# Patient Record
Sex: FEMALE | Employment: OTHER
[De-identification: names, ages, dates, MRNs, and addresses within clinical notes are randomized per-mention and may not be internally consistent; named-entity substitution may affect disease eponyms.]

---

## 2024-07-30 PROBLEM — M81.0 OSTEOPOROSIS: Status: ACTIVE | Noted: 2024-07-30

## 2024-08-07 ENCOUNTER — HOSPITAL ENCOUNTER (OUTPATIENT)
Facility: HOSPITAL | Age: 74
Setting detail: INFUSION SERIES
Discharge: HOME | End: 2024-08-07
Payer: COMMERCIAL

## 2024-08-07 VITALS
BODY MASS INDEX: 24.68 KG/M2 | HEART RATE: 69 BPM | RESPIRATION RATE: 18 BRPM | SYSTOLIC BLOOD PRESSURE: 135 MMHG | DIASTOLIC BLOOD PRESSURE: 85 MMHG | HEIGHT: 66 IN | WEIGHT: 153.6 LBS | TEMPERATURE: 97.4 F

## 2024-08-07 DIAGNOSIS — M81.0 OSTEOPOROSIS, UNSPECIFIED OSTEOPOROSIS TYPE, UNSPECIFIED PATHOLOGICAL FRACTURE PRESENCE: Primary | ICD-10-CM

## 2024-08-07 LAB
ANION GAP BLD CALC-SCNC: 6.6 MMOL/L (ref 10–20)
CA-I BLD-MCNC: 1.28 MMOL/L (ref 1.12–1.32)
CHLORIDE BLD-SCNC: 104 MMOL/L (ref 98–107)
CO2 BLD-SCNC: 30.4 MMOL/L (ref 21–32)
CREAT BLD-MCNC: 0.79 MG/DL (ref 0.6–1.3)
GLUCOSE BLD-MCNC: 104 MG/DL (ref 70–110)
POTASSIUM BLD-SCNC: 4.2 MMOL/L (ref 3.5–5.1)
SERVICE CMNT-IMP: ABNORMAL
SODIUM BLD-SCNC: 141 MMOL/L (ref 136–145)

## 2024-08-07 PROCEDURE — 6360000002 HC RX W HCPCS: Performed by: INTERNAL MEDICINE

## 2024-08-07 PROCEDURE — 2580000003 HC RX 258: Performed by: INTERNAL MEDICINE

## 2024-08-07 PROCEDURE — 96365 THER/PROPH/DIAG IV INF INIT: CPT

## 2024-08-07 PROCEDURE — 80047 BASIC METABLC PNL IONIZED CA: CPT

## 2024-08-07 RX ORDER — SODIUM CHLORIDE 9 MG/ML
5-250 INJECTION, SOLUTION INTRAVENOUS PRN
Status: CANCELLED | OUTPATIENT
Start: 2024-08-07

## 2024-08-07 RX ORDER — SODIUM CHLORIDE 9 MG/ML
5-250 INJECTION, SOLUTION INTRAVENOUS PRN
Status: DISCONTINUED | OUTPATIENT
Start: 2024-08-07 | End: 2024-08-08 | Stop reason: HOSPADM

## 2024-08-07 RX ORDER — ZOLEDRONIC ACID 5 MG/100ML
5 INJECTION, SOLUTION INTRAVENOUS ONCE
Status: CANCELLED | OUTPATIENT
Start: 2024-08-07 | End: 2024-08-07

## 2024-08-07 RX ORDER — ZOLEDRONIC ACID 5 MG/100ML
5 INJECTION, SOLUTION INTRAVENOUS ONCE
Status: COMPLETED | OUTPATIENT
Start: 2024-08-07 | End: 2024-08-07

## 2024-08-07 RX ADMIN — SODIUM CHLORIDE 50 ML/HR: 9 INJECTION, SOLUTION INTRAVENOUS at 11:49

## 2024-08-07 RX ADMIN — ZOLEDRONIC ACID 5 MG: 5 INJECTION, SOLUTION INTRAVENOUS at 12:00

## 2024-08-07 ASSESSMENT — PAIN SCALES - GENERAL: PAINLEVEL_OUTOF10: 0

## 2024-08-07 NOTE — PROGRESS NOTES
[facility] Short Note                       Date: 2024    Name: Peggy Simpson    MRN: [id number]         : 1950      Pt admit to [facility] for Reclast ambulatory in stable condition. Assessment completed and documented in flowsheets. No acute concerns at this time. Pt denies recent and upcoming invasive dental procedures and verbalizes awareness to notify her dentist that she is on Reclast. Pt states she is currently taking Xanax, Vit D, Lipitor and Prestiq at home but she does not remember the dosages. Notified Shahid in pharmacy of pt's home meds and he states ok to give Reclast.  Please review pending lab results in CC.      Ms. Simpson's vitals were reviewed prior to and after treatment.   Patient Vitals for the past 12 hrs:   Temp Pulse Resp BP   24 1221 -- 69 -- 135/85   24 1046 97.4 °F (36.3 °C) 73 18 (!) 146/90         Lab results were obtained and reviewed. Labs within parameter for treatment.  Recent Results (from the past 12 hour(s))   POC CHEM 8    Collection Time: 24 11:13 AM   Result Value Ref Range    POC Ionized Calcium 1.28 1.12 - 1.32 mmol/L    POC Sodium 141 136 - 145 mmol/L    POC Potassium 4.2 3.5 - 5.1 mmol/L    POC Chloride 104 98 - 107 mmol/L    POC TCO2 30.4 21 - 32 mmol/L    Anion Gap, POC 6.6 (L) 10 - 20 mmol/L    POC Glucose 104 70 - 110 mg/dL    POC Creatinine 0.79 0.6 - 1.3 mg/dL    eGFR, POC 79 >60 ml/min/1.73m2    UA Comment Comment Not Indicated.         Medications given: via PIV  Medications Administered         0.9 % sodium chloride infusion Admin Date  2024 Action  New Bag Dose  50 mL/hr Rate  50 mL/hr Route  IntraVENous Administered By  Elinor Oliva RN        zoledronic acid (RECLAST) 5 mg/100 mL infusion Admin Date  2024 Action  New Bag Dose  5 mg Rate  400 mL/hr Route  IntraVENous Administered By  Elinor Oliva RN            PIV positive blood return noted, flushed and removed prior to discharge.    Ms. Simpson tolerated the infusion, and had